# Patient Record
Sex: MALE | Race: ASIAN | NOT HISPANIC OR LATINO
[De-identification: names, ages, dates, MRNs, and addresses within clinical notes are randomized per-mention and may not be internally consistent; named-entity substitution may affect disease eponyms.]

---

## 2024-02-08 PROBLEM — Z00.00 ENCOUNTER FOR PREVENTIVE HEALTH EXAMINATION: Status: ACTIVE | Noted: 2024-02-08

## 2024-02-09 PROBLEM — E03.9 HYPOTHYROIDISM: Status: ACTIVE | Noted: 2024-02-09

## 2024-02-09 RX ORDER — LEVOTHYROXINE SODIUM 0.05 MG/1
50 TABLET ORAL
Refills: 0 | Status: ACTIVE | COMMUNITY

## 2024-02-09 RX ORDER — CARVEDILOL 12.5 MG/1
12.5 TABLET, FILM COATED ORAL
Refills: 0 | Status: ACTIVE | COMMUNITY

## 2024-02-09 RX ORDER — ASPIRIN ENTERIC COATED TABLETS 81 MG 81 MG/1
81 TABLET, DELAYED RELEASE ORAL
Refills: 0 | Status: ACTIVE | COMMUNITY

## 2024-02-09 RX ORDER — ISOSORBIDE MONONITRATE 30 MG
30 TABLET, EXTENDED RELEASE 24 HR ORAL
Refills: 0 | Status: ACTIVE | COMMUNITY

## 2024-02-09 RX ORDER — VALSARTAN 80 MG/1
80 TABLET, COATED ORAL
Refills: 0 | Status: ACTIVE | COMMUNITY

## 2024-02-09 RX ORDER — ROSUVASTATIN CALCIUM 10 MG/1
10 TABLET, FILM COATED ORAL
Refills: 0 | Status: ACTIVE | COMMUNITY

## 2024-02-12 ENCOUNTER — APPOINTMENT (OUTPATIENT)
Dept: CARDIOTHORACIC SURGERY | Facility: CLINIC | Age: 79
End: 2024-02-12
Payer: COMMERCIAL

## 2024-02-12 ENCOUNTER — NON-APPOINTMENT (OUTPATIENT)
Age: 79
End: 2024-02-12

## 2024-02-12 ENCOUNTER — APPOINTMENT (OUTPATIENT)
Dept: HEART AND VASCULAR | Facility: CLINIC | Age: 79
End: 2024-02-12
Payer: COMMERCIAL

## 2024-02-12 VITALS
OXYGEN SATURATION: 99 % | HEART RATE: 62 BPM | HEIGHT: 66 IN | WEIGHT: 185 LBS | SYSTOLIC BLOOD PRESSURE: 159 MMHG | BODY MASS INDEX: 29.73 KG/M2 | DIASTOLIC BLOOD PRESSURE: 80 MMHG | RESPIRATION RATE: 15 BRPM

## 2024-02-12 VITALS
SYSTOLIC BLOOD PRESSURE: 127 MMHG | BODY MASS INDEX: 29.73 KG/M2 | HEIGHT: 66 IN | RESPIRATION RATE: 15 BRPM | OXYGEN SATURATION: 97 % | DIASTOLIC BLOOD PRESSURE: 61 MMHG | HEART RATE: 61 BPM | WEIGHT: 185 LBS

## 2024-02-12 VITALS — DIASTOLIC BLOOD PRESSURE: 70 MMHG | SYSTOLIC BLOOD PRESSURE: 130 MMHG

## 2024-02-12 DIAGNOSIS — I25.10 ATHEROSCLEROTIC HEART DISEASE OF NATIVE CORONARY ARTERY W/OUT ANGINA PECTORIS: ICD-10-CM

## 2024-02-12 DIAGNOSIS — I35.1 NONRHEUMATIC AORTIC (VALVE) INSUFFICIENCY: ICD-10-CM

## 2024-02-12 DIAGNOSIS — I25.118 ATHEROSCLEROTIC HEART DISEASE OF NATIVE CORONARY ARTERY WITH OTHER FORMS OF ANGINA PECTORIS: ICD-10-CM

## 2024-02-12 DIAGNOSIS — E78.5 HYPERLIPIDEMIA, UNSPECIFIED: ICD-10-CM

## 2024-02-12 DIAGNOSIS — I10 ESSENTIAL (PRIMARY) HYPERTENSION: ICD-10-CM

## 2024-02-12 DIAGNOSIS — I50.9 HEART FAILURE, UNSPECIFIED: ICD-10-CM

## 2024-02-12 DIAGNOSIS — Z86.39 PERSONAL HISTORY OF OTHER ENDOCRINE, NUTRITIONAL AND METABOLIC DISEASE: ICD-10-CM

## 2024-02-12 DIAGNOSIS — Z86.79 PERSONAL HISTORY OF OTHER DISEASES OF THE CIRCULATORY SYSTEM: ICD-10-CM

## 2024-02-12 DIAGNOSIS — E03.9 HYPOTHYROIDISM, UNSPECIFIED: ICD-10-CM

## 2024-02-12 DIAGNOSIS — Z87.891 PERSONAL HISTORY OF NICOTINE DEPENDENCE: ICD-10-CM

## 2024-02-12 DIAGNOSIS — E66.3 OVERWEIGHT: ICD-10-CM

## 2024-02-12 PROCEDURE — 99204 OFFICE O/P NEW MOD 45 MIN: CPT

## 2024-02-12 PROCEDURE — 93000 ELECTROCARDIOGRAM COMPLETE: CPT

## 2024-02-12 PROCEDURE — 99205 OFFICE O/P NEW HI 60 MIN: CPT | Mod: 25

## 2024-02-12 NOTE — CARDIOLOGY SUMMARY
[de-identified] : 2/12/24: Sinus bradycardia at 56 bpm, anteroseptal Q waves, artifact [de-identified] : NST 1/8/24: negative stress ECG, no evidence of ischemia [de-identified] : TTE 12/22/23: EF 60-65%, normal biventricular size and systolic function, moderate to severe AI, calcific trileaflet AV, MAC with mild MR, mild TR/PI, ascending aorta 4.1cm [de-identified] : CT heart calcium score 12/15/23: 1149.3, 94th percentile for age/ethnicity/gender. [de-identified] : [facility] 2/5/24: EF >60%, 3-4+ AI, right dominant, dLM 50%, pLAD 80%, m/d LAD 50-70%, mLCx 95%, RI normal, p/m RCA 90%, distal to PDA diffuse 90%

## 2024-02-12 NOTE — HISTORY OF PRESENT ILLNESS
[FreeTextEntry1] : 79-year-old man, with a past medical history of CAD (4 vessel disease on [facility] 2/5/24), AI, hypertension, hyperlipidemia, and hypothyroidism, presenting for establishment of cardiovascular care. Patient was previously under the care of a cardiologist in [place] given chest and back discomfort on exertion, and with whom he underwent CT heart calcium score 12/15/23 which was elevated at 1149.3, 94th percentile for age/ethnicity/gender. Nuclear stress test 1/8/24 with neg stress ECG, no evidence of ischemia. ECHO 12/22/23 revealing EF 60-65%, normal biventricular size and systolic function, moderate to severe AI, calcific trileaflet AV, MAC with mild MR, mild TR/PI, ascending aorta 4.1cm. Subsequent Right and Left heart cath 2/5/24 revealed EF >60%, 3-4+ AI, right dominant, dLM 50%, pLAD 80%, m/d LAD 50-70%, mLCx 95%, RI normal, p/m RCA 90%, distal to PDA diffuse 90%. Pt was subsequently referred to CTS for evaluation of CAD and AI. Presently, he reports that he still experiences mild chest pressure with exertion, which is relieved with rest.  Otherwise, he currently denies SOB/YI, palpitations, dizziness/LOC, PND, orthopnea, HAs, abdominal pain, and LE swelling.

## 2024-02-12 NOTE — PHYSICAL EXAM
[No Acute Distress] : no acute distress [Normal Conjunctiva] : normal conjunctiva [Normal Venous Pressure] : normal venous pressure [No Carotid Bruit] : no carotid bruit [Normal S1, S2] : normal S1, S2 [No Rub] : no rub [No Gallop] : no gallop [Clear Lung Fields] : clear lung fields [Good Air Entry] : good air entry [No Respiratory Distress] : no respiratory distress  [Soft] : abdomen soft [Non Tender] : non-tender [No Masses/organomegaly] : no masses/organomegaly [Normal Bowel Sounds] : normal bowel sounds [Normal Gait] : normal gait [No Edema] : no edema [No Cyanosis] : no cyanosis [No Clubbing] : no clubbing [No Varicosities] : no varicosities [No Rash] : no rash [No Skin Lesions] : no skin lesions [Moves all extremities] : moves all extremities [No Focal Deficits] : no focal deficits [Normal Speech] : normal speech [Alert and Oriented] : alert and oriented [Normal memory] : normal memory [de-identified] : 2/6 early-diastolic, blowing murmur, best heard at the LLSB

## 2024-02-14 NOTE — DATA REVIEWED
[FreeTextEntry1] : As noted in HPI.  EC24: Sinus bradycardia at 56 bpm, anteroseptal Q waves, artifact   Stress Test: NST 24: negative stress ECG, no evidence of ischemia   Echo: TTE 23: EF 60-65%, normal biventricular size and systolic function, moderate to severe AI, calcific trileaflet AV, MAC with mild MR, mild TR/PI, ascending aorta 4.1cm   CT/MRI: CT heart calcium score 12/15/23: 1149.3, 94th percentile for age/ethnicity/gender.   Cardiac Cath/PCI: [facility] 24: EF >60%, 3-4+ AI, right dominant, dLM 50%, pLAD 80%, m/d LAD 50-70%, mLCx 95%, RI normal, p/m RCA 90%, distal to PDA diffuse 90%

## 2024-02-14 NOTE — ASSESSMENT
[FreeTextEntry1] : 80 yo M with PMHx of HTN, HLD, hypothyroidism, who initially presented to cardiologist with reports of chest heaviness, presents for surgical consultation of CAD and severe AI.  Plan:  I had a lengthy discussion with the patient regarding his coronary and valvular disease and progression. I have recommended that the patient is a candidate for CABG, AVR.  The patient was educated on various valve options - bio vs. mechanical. I have recommended the patient for a bio prosthetic valve. I have discussed the risks, benefits and alternatives to surgery. I have explained the risks of the surgery, including approximately 1-2% major mortality or morbidity including stroke, infection, bleeding, death, renal failure and heart attack. All questions were addressed. The patient would like to further discuss with his family prior to scheduling (possibly April).  -pt is a candidate for AVR, CABG. -admit the day prior for HF management. will need labs, chest xray, carotid doppler.  -BP management. -continue current medication regimen.

## 2024-02-14 NOTE — END OF VISIT
[Time Spent: ___ minutes] : I have spent [unfilled] minutes of time on the encounter. [FreeTextEntry3] :   I, Dr. Jose Canales, personally performed the evaluation and management (E/M) services for this new patient.  That E/M includes conducting the clinically appropriate initial history &/or exam, assessing all conditions, and establishing the plan of care.  Today, my KRISH,  was here to observe &/or participate in the visit & follow plan of care established by me.   I discussed this case in detail with Dr. Kush Ramos, and I agree with his assessment (and that of his [place] cardiology team) that the optimal solution is CABG + AVR. Although he is 79 years old, he is relatively robust, and this would be a more optimal solution in view of the number of stents PCI would require.

## 2024-02-14 NOTE — HISTORY OF PRESENT ILLNESS
[FreeTextEntry1] : 80 yo M with PMHx of HTN, HLD, hypothyroidism, who initially presented to cardiologist with reports of chest heaviness, presents for surgical consultation of CAD and severe AI. Pt is under the care of Dr. Ramos.   CT heart calcium score 12/15/23 which was elevated at 1149.3, 94th percentile for age/ethnicity/gender. Nuclear stress test 1/8/24 with neg stress ECG, no evidence of ischemia. ECHO 12/22/23 revealing EF 60-65%, normal biventricular size and systolic function, moderate to severe AI, calcific trileaflet AV, MAC with mild MR, mild TR/PI, ascending aorta 4.1cm.  Right and Left heart cath 2/5/24 revealed EF >60%, 3-4+ AI, right dominant, dLM 50%, pLAD 80%, m/d LAD 50-70%, mLCx 95%, RI normal, p/m RCA 90%, distal to PDA diffuse 90%.   Pt reports chest heaviness/pressure since 9/2023 with exercising on elliptical machine and ambulating up 2-3 flights of stairs.